# Patient Record
Sex: MALE | Race: WHITE | Employment: OTHER | URBAN - METROPOLITAN AREA
[De-identification: names, ages, dates, MRNs, and addresses within clinical notes are randomized per-mention and may not be internally consistent; named-entity substitution may affect disease eponyms.]

---

## 2019-08-13 ENCOUNTER — ANESTHESIA EVENT (OUTPATIENT)
Dept: SURGERY | Age: 69
DRG: 699 | End: 2019-08-13
Payer: MEDICARE

## 2019-08-13 ENCOUNTER — HOSPITAL ENCOUNTER (INPATIENT)
Age: 69
LOS: 1 days | Discharge: HOME | DRG: 699 | End: 2019-08-15
Attending: UROLOGY | Admitting: UROLOGY
Payer: MEDICARE

## 2019-08-13 DIAGNOSIS — N13.1 HYDRONEPHROSIS WITH URETERAL STRICTURE, NOT ELSEWHERE CLASSIFIED: Primary | ICD-10-CM

## 2019-08-13 LAB
EST. AVERAGE GLUCOSE BLD GHB EST-MCNC: 157 MG/DL
GLUCOSE BLD STRIP.AUTO-MCNC: 148 MG/DL (ref 70–110)
GLUCOSE BLD STRIP.AUTO-MCNC: 168 MG/DL (ref 70–110)
HBA1C MFR BLD: 7.1 % (ref 4.2–5.6)

## 2019-08-13 PROCEDURE — 74011250636 HC RX REV CODE- 250/636: Performed by: UROLOGY

## 2019-08-13 PROCEDURE — 77030005208 HC CATH HLDR GLWC -A: Performed by: UROLOGY

## 2019-08-13 PROCEDURE — 83036 HEMOGLOBIN GLYCOSYLATED A1C: CPT

## 2019-08-13 PROCEDURE — 74011250636 HC RX REV CODE- 250/636: Performed by: NURSE ANESTHETIST, CERTIFIED REGISTERED

## 2019-08-13 PROCEDURE — 77030027138 HC INCENT SPIROMETER -A

## 2019-08-13 PROCEDURE — 82962 GLUCOSE BLOOD TEST: CPT

## 2019-08-13 PROCEDURE — 36415 COLL VENOUS BLD VENIPUNCTURE: CPT

## 2019-08-13 PROCEDURE — C1729 CATH, DRAINAGE: HCPCS | Performed by: UROLOGY

## 2019-08-13 PROCEDURE — 99218 HC RM OBSERVATION: CPT

## 2019-08-13 RX ORDER — SODIUM CHLORIDE, SODIUM LACTATE, POTASSIUM CHLORIDE, CALCIUM CHLORIDE 600; 310; 30; 20 MG/100ML; MG/100ML; MG/100ML; MG/100ML
25 INJECTION, SOLUTION INTRAVENOUS CONTINUOUS
Status: DISCONTINUED | OUTPATIENT
Start: 2019-08-13 | End: 2019-08-13

## 2019-08-13 RX ORDER — METOPROLOL TARTRATE 50 MG/1
100 TABLET ORAL DAILY
Status: DISCONTINUED | OUTPATIENT
Start: 2019-08-14 | End: 2019-08-15 | Stop reason: HOSPADM

## 2019-08-13 RX ORDER — INSULIN LISPRO 100 [IU]/ML
INJECTION, SOLUTION INTRAVENOUS; SUBCUTANEOUS ONCE
Status: DISCONTINUED | OUTPATIENT
Start: 2019-08-14 | End: 2019-08-14 | Stop reason: SDUPTHER

## 2019-08-13 RX ORDER — TEMAZEPAM 7.5 MG/1
15 CAPSULE ORAL
Status: DISCONTINUED | OUTPATIENT
Start: 2019-08-13 | End: 2019-08-15 | Stop reason: HOSPADM

## 2019-08-13 RX ORDER — FAMOTIDINE 20 MG/1
20 TABLET, FILM COATED ORAL ONCE
Status: COMPLETED | OUTPATIENT
Start: 2019-08-14 | End: 2019-08-14

## 2019-08-13 RX ORDER — AMIODARONE HYDROCHLORIDE 200 MG/1
200 TABLET ORAL DAILY
Status: DISCONTINUED | OUTPATIENT
Start: 2019-08-14 | End: 2019-08-15 | Stop reason: HOSPADM

## 2019-08-13 RX ORDER — SODIUM CHLORIDE 9 MG/ML
100 INJECTION, SOLUTION INTRAVENOUS CONTINUOUS
Status: DISCONTINUED | OUTPATIENT
Start: 2019-08-13 | End: 2019-08-15 | Stop reason: HOSPADM

## 2019-08-13 RX ORDER — INSULIN LISPRO 100 [IU]/ML
INJECTION, SOLUTION INTRAVENOUS; SUBCUTANEOUS
Status: DISCONTINUED | OUTPATIENT
Start: 2019-08-13 | End: 2019-08-15 | Stop reason: HOSPADM

## 2019-08-13 RX ORDER — MAGNESIUM SULFATE 100 %
4 CRYSTALS MISCELLANEOUS AS NEEDED
Status: DISCONTINUED | OUTPATIENT
Start: 2019-08-13 | End: 2019-08-15 | Stop reason: HOSPADM

## 2019-08-13 RX ORDER — PANTOPRAZOLE SODIUM 40 MG/1
40 TABLET, DELAYED RELEASE ORAL
Status: DISCONTINUED | OUTPATIENT
Start: 2019-08-14 | End: 2019-08-15 | Stop reason: HOSPADM

## 2019-08-13 RX ORDER — ATORVASTATIN CALCIUM 40 MG/1
40 TABLET, FILM COATED ORAL
Status: DISCONTINUED | OUTPATIENT
Start: 2019-08-13 | End: 2019-08-15 | Stop reason: HOSPADM

## 2019-08-13 RX ADMIN — SODIUM CHLORIDE 100 ML/HR: 900 INJECTION, SOLUTION INTRAVENOUS at 23:00

## 2019-08-13 RX ADMIN — SODIUM CHLORIDE, SODIUM LACTATE, POTASSIUM CHLORIDE, AND CALCIUM CHLORIDE 25 ML/HR: 600; 310; 30; 20 INJECTION, SOLUTION INTRAVENOUS at 17:42

## 2019-08-13 NOTE — ROUTINE PROCESS
Bedside and Verbal shift change report given to Lon (oncoming nurse) by oTmeka Gordillo RN   (offgoing nurse). Report included the following information SBAR, Kardex and MAR.

## 2019-08-13 NOTE — H&P
Donnie Mckinney   1950  68 y.o.       UROLOGY H&P           Encounter Diagnoses       ICD-10-CM ICD-9-CM   1. Malignant neoplasm of urinary bladder, unspecified site (HCC) C67.9 188.9   2. Urinary tract infection without hematuria, site unspecified N39.0 599.0   3. Ureteral stricture N13.5 593. 3      Assessment:   1. 68 y.o. 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 Kelle Barr on 18 for [id number], No NAC               -Incidental prostate cancer- no canelo score provided              - Post-op course was complicated by prolonged ICU stay, AMS, BUBBA, ileus, bowel and urine leak, and flu              -S/p ileo-ileo anastomotic revision 18 in [place], [place]              -S/p b/l PCNs in 2018              -S/p bilateral PCN conversion to PCNU on 2019              -CTU 3/5/2019: bilateral PCN tubes and J-tubes extend from the kidneys to the conduit accident. no sign of obstruction              -CT Abd 2019: Mild left hydronephrosis, with urothelial enhancement of the bilateral renal collecting systems, and increased stranding around the left kidney, concerning for infection. -S/p bilateral PCNU change to bilateral PCN tubes on 19.               -Right PCN tube removed 2019.              -Renal Scan 19 - normal right renal function, mildly prolonged excretory half time for the left kidney related to possible ureteral stricture     2. Urine leak. managed with bilateral PCNs. Placed in 2018.         - Converted to PCNU 2019.         - Bilateral PCN tubes replaced 19.         - Right PCN tube removed 19     3. Bowel leak s/p ex-lap, small bowel resection with primary anastomosis 18     4. Left ureteral stricture.  S/p antegrade nephrostogram on 1/15/19 with left sided collecting system with mild hydronephrosis and small region of luminal narrowing superior to the left ureteral stent could represent a small stricture. Renal Scan 5/16/19 - mildly prolonged excretory half time for the left kidney related to possible ureteral stricture     5. Right subphrenic fluid collection identified on CT abd/pelvis 1/15/19.  6. Midline abdominal wound. Managed with aquacel AG by [facility]. 7. Afib on Xarelto   8. DM  9. HTN  10. Right renal cyst intermediate density on CT abd/pelvis 1/15/19     Plan:    ? Plan to continue with left PCN tube for now. Continue to flush daily. ? Start Augmentin 875/125 bid x 14 days, 10 days prior to procedure (today)  ? Continue aquacel AG and home [facility] wound care 2x/week for abdominal wound  ? Clearance given to hold Xarelto 2 days prior given by Dr. Cris Stout (cardio)  ? To OR for left loopogram, left anterograde nephrostogram, left anterograde ureteroscopy, with possible balloon dilation, possible biopsy, and left PCN exchange. R/b and indications reviewed     I am following the established plan for Dr. Anabella Natarajan and Dr. Samantha Singh is the supervising physician for this day.     Chief complaint:      Chief Complaint   Patient presents with    Hydronephrosis    Pre-op Exam      History of Present Illness: Grace Bence is a 68 y.o. 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 Here today for pre-op appointment.     He underwent RC/IC for muscle invasive bladder cancer in [place], [place] and had a complicated post-op course including AMS, BUBBA, ileus, bowel and urine leak requiring re-exploration with revision of anastomosis and b/l PCN placement. While in the HER unit, PIEDAD creatine was elevated at 3.1. S/p bilateral PCN conversion to PCNU on 2/26/2019. Recent CTU 3/5/2019 revealed bilateral stents in place. No signs of obstruction.   S/p bilateral nephrostomy tube change to bilateral PCN tubes on 5/9/19. Renal Scan on 5/16/19 revealed normal right renal function, mildly prolonged excretory half time for the left kidney related to a possible ureteral stricture. Right PCN tube removed 5/28/19.     Patient presents today with his wife. She notes his wound has been mildly improving and he continues using the aquacel and is seen by wound care 2x/week.      No major changes since last office visit. Reports clear yellow urine from left PCN and IC. Feels he is putting out more from left PCN than ostomy. No hematuria. Patient's biggest complaint is abdominal soreness. Reports improving wound, flattening. Saw Cardio and was given clearance to D/C Xarelto.     Past Medical History       Past Medical History:   Diagnosis Date    Bladder cancer [facility])      Cardiac arrhythmia      Cardiac disease      Chronic atrial fibrillation (HCC)      Diabetes mellitus ([facility] [address]      GERD (gastroesophageal reflux disease)      HTN (hypertension)      Hypercholesterolemia           Past Surgical History        Past Surgical History:   Procedure Laterality Date    HX CORONARY ARTERY BYPASS GRAFT   2008    HX CYSTECTOMY   11/26/2018    HX LITHOTRIPSY   11/2012    IR CONVERT NEPHRO PERC LT TO NEPHROURETERAL CATH EXISTING SI   2/26/2019    IR EXCHANGE NEPHRO PERC LT SI   5/9/2019         Family History  History reviewed.  No pertinent family history.     Social History  Social History            Socioeconomic History    Marital status:        Spouse name: Not on file    Number of children: Not on file    Years of education: Not on file    Highest education level: Not on file   Occupational History    Not on file   Social Needs    Financial resource strain: Not on file    Food insecurity:       Worry: Not on file       Inability: Not on file    Transportation needs:       Medical: Not on file       Non-medical: Not on file   Tobacco Use    Smoking status: Former Smoker       Types: Cigarettes    Smokeless tobacco: Former User   Substance and Sexual Activity    Alcohol use: No       Frequency: Never    Drug use: Not on file    Sexual activity: Not on file   Lifestyle    Physical activity:       Days per week: Not on file       Minutes per session: Not on file    Stress: Not on file   Relationships    Social connections:       Talks on phone: Not on file       Gets together: Not on file       Attends [employer] service: Not on file       Active member of club or organization: Not on file       Attends meetings of clubs or organizations: Not on file       Relationship status: Not on file    Intimate partner violence:       Fear of current or ex partner: Not on file       Emotionally abused: Not on file       Physically abused: Not on file       Forced sexual activity: Not on file   Other Topics Concern    Not on file   Social History Narrative    Not on file         No Known Allergies       Current Outpatient Medications   Medication Sig    omeprazole (PRILOSEC) 20 mg capsule TK 1 C PO QAM PRF ACID REFLUX    amoxicillin-clavulanate (AUGMENTIN) 875-125 mg per tablet Take 1 Tab by mouth every twelve (12) hours. Start 10 days prior to procedure    traMADol (ULTRAM) 50 mg tablet TK 1 TO 2 TS PO Q 12 H PRN P    insulin degludec (TRESIBA FLEXTOUCH U-100) 100 unit/mL (3 mL) inpn 5 Units by SubCUTAneous route.  acetaminophen (TYLENOL) 325 mg tablet 650 mg.    amiodarone (CORDARONE) 200 mg tablet Take 200 mg by mouth.  atorvastatin (LIPITOR) 40 mg tablet TK 1 T PO HS FOR CHOLESTEROL    glucose blood VI test strips (ASCENSIA AUTODISC VI, ONE TOUCH ULTRA TEST VI) strip 50 Each.  CARTIA  mg ER capsule TK 2 CS PO D    metoprolol tartrate (LOPRESSOR) 100 mg IR tablet Take 100 mg by mouth.  multivitamin, tx-iron-ca-min (THERAPY M) 9 mg iron-400 mcg tab tablet Take 1 Tab by mouth.     XARELTO 20 mg tab tablet TK 1 T PO D    temazepam (RESTORIL) 15 mg capsule 15 mg.      No current facility-administered medications for this visit.          Review of Systems  Constitutional: Fever: No  Skin: Rash: No  HEENT: Hearing difficulty: No  Eyes: Blurred vision: No  Cardiovascular: Chest pain: No  Respiratory: Shortness of breath: No  Gastrointestinal: Nausea/vomiting: No  Musculoskeletal: Back pain: No  Neurological: Weakness: No  Psychological: Memory loss: No  Comments/additional findings:         Physical Exam:   Visit Vitals  /82   Ht 5' 11\" (1.803 m)   Wt 201 lb (91.2 kg)   BMI 28.03 kg/m²      Constitutional: WDWN, Pleasant and appropriate affect, No acute distress. CV:  No peripheral swelling noted  Respiratory: No respiratory distress or difficulties  Abdomen:  No abdominal masses or tenderness. Soft, ND. Left PCN in place with clear yellow urine. Midline incision continues to close. Lower portion of the wound still somewhat open. Good granulation tissue. Skin: No jaundice. Neuro/Psych:  Alert and oriented, affect appropriate.      Labs reviewed today:        Results for orders placed or performed in visit on 05/28/19   URINE C&S   Result Value Ref Range     FINAL REPORT Microbiology results (A)     CYTOLOGY NON GYN, [facility]   Result Value Ref Range     APRESULT AP results           Radiology:     Renal Scan 5/16/2019  IMPRESSION  1. Normal right renal function  2. Mildly prolonged excretory half time for the left kidney but no evidence of mechanical obstruction.     CT A/P 4/11/2019  Impression:  1. Mild left hydronephrosis, with urothelial enhancement of the bilateral renal collecting systems, and increased stranding around the left kidney, concerning for infection.  -Bilateral percutaneous nephrostomy tubes and stents are in satisfactory position  2. Small fluid collection in the cystectomy bed in the pelvis, measuring 6.0 x 2.3 x 3.2 cm. This could potentially represent abscess or postoperative seroma.  This is new from prior study (previously a surgical drain was in this location). 3. Additional tiny fluid collection in the midline rectus musculature, just below the umbilicus, new from prior. 4. Stable low-attenuation lesion in the right interpolar kidney, measuring 1.4 cm, possibly a complex cyst.  5. Diverticulosis.     CTU 3/5/2019  IMPRESSION   The patient has had a prior cystectomy. Percutaneous nephrostomy tubes are noted bilaterally. J-tubes extend from the kidneys to the conduit accident. There is no sign of obstruction. Tiny calcifications in the kidneys are probably vascular. A simple cyst is present at the upper pole of the right kidney. In the midportion of the kidney there is a vague area of decreased density. The appearance has not changed significantly since the previous study, but malignancy is not excluded.     Diverticulosis with no sign of diverticulitis. Prior cholecystectomy.        Antegrade nephrostograms 1/15/19:      1. Left-sided collecting system with mild hydronephrosis. No obstruction to contrast.   2. Very small region of luminal narrowing superior to the left ureteral stent could represent a small stricture. 3. Right collecting system is patent.       See additional details above.       As attending radiologist, I have assessed the study images and dictated or reviewed/edited the final report as needed.       Dictated ByKristi Cavanaugh on 1/15/2019 3:14 PM       As attending radiologist, I have assessed the study images, and dictated or reviewed/edited the final report as needed.       Signed By: Silvano Santillan MD on 1/15/2019 4:27 PM           Narrative   NEPHROSTOGRAM       INDICATION: Bilateral percutaneous nephrostomies, and ureteral stents. Ileal conduit       FLUOROSCOPY TIME: 0.3 minutes   FLUOROSCOPIC IMAGES: 37 fluoroscopic images. 3 overhead images. COMPARISON: CT abdomen pelvis 1/15/2019       TECHNIQUE: Bilateral percutaneous nephrostomies were accessed at the skin surface.  The PCNs were interrogated with 20 cc of Isovue 350 on the right and 35 on the left. After instillation of contrast, AP and oblique images were obtained. PCN tubes were flushed with 10 cc of saline.        FINDINGS:   image demonstrates bilateral PCNs and ureteral stents with ileal conduit. Trace residual contrast remains from prior study. Proximal loops of the ureteral stents remain at roughly proximal mid ureter level       The right PCN and stent are intact. No hydronephrosis or filling defects identified. No obstruction.       The left PCN and stent are intact. 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 The collecting system is mildly hydronephrotic relative to the right. Just superior to the left stent there is mild narrowing of the ureteral lumen. No filling defects identified. No obstruction.          CT abd/pelvis 1/15/19:      Postoperative cystectomy. Ureteral stents and nephrostomies noted bilaterally. Both J stents are positioned with the proximal loops in the upper ureter 6 cm distal to the renal pelvis.    Small right subphrenic fluid collection   Nonsimple right renal cyst of intermediate density.       Signed By: Jazmín Parr MD on 1/15/2019 9:10 AM           Narrative   EXAM: CT ABDOMEN AND PELVIS WITH CONTRAST       CLINICAL INDICATION/HISTORY: I would like a CT A/P with and without contrast to look for any occult fluid collections.  Pt is looking to D/C to SNF soon.  Additionally, he is looking to transition care [facility] from outside group.  Thank you.       COMPARISON: No prior       TECHNIQUE:  CT abdomen and pelvis with 100 cc of Omnipaque 350 IV contrast.   All CT scans at this facility are performed using dose optimization technique as appropriate to the performed examination, to include automated exposure control, adjustment of the mA and/or kV according to patient's size (including appropriate matching for site-specific examinations), or use of an iterative reconstruction technique.       FINDINGS:    Lower chest: Negative.       Liver: There are 2 small simple appearing hepatic cysts noted ranging up to 2.7 cm. Otherwise negative.       Biliary: Gallbladder surgically absent. Biliary tree is not dilated.       Pancreas: Negative.       Spleen: Negative.       Adrenal glands: Negative.       Kidneys: There are bilateral nephrostomies in place with mild fullness of the right renal collecting system and mild prominence of the urothelium. A 2.1 cm intermediate density abnormality is seen in the right mid kidney laterally and a 1.2 cm simple appearing cyst is seen superiorly on the right. A J stent is present with the tip in the proximal ureter and the distal and extending out a conduit. On the left the configuration is similar with the proximal J in the upper ureter extending out into the conduit.       Bladder and Pelvic Organs: The bladder is surgically absent. A surgical drain is seen in the bladder bed       Stomach, Small Bowel and Colon: The stomach and small bowel are unremarkable. The appendix is normal in appearance. Colonic diverticulosis is noted.       Lymph nodes: No lymphadenopathy.        Vessels: Unremarkable for age.        Peritoneal Spaces: There is a small subphrenic collection on the right which measures 1.1 x 5.8 x 8 cm.       Body wall: Negative.       Bones: Senescent changes are noted without unexpected finding for age.         A copy of today's office visit with all pertinent imaging results and labs were sent to the referring physician. CC: MD Valery Kumari PA-C  [facility]      Patient's BMI is out of the normal parameters.   Information about BMI was given and patient was advised to follow-up with their PCP for further management.

## 2019-08-13 NOTE — ROUTINE PROCESS
1630: Pt arrived to the unit. Accepted by [facility]. Care assumed at this time. No acute distress noted. IV initiated at this time. Pt tolerated well. Denise Cruz paged by charge nurse for orders. Will continue to monitor. 1800: Plans for surgery in am. Pt resting at this time with wife at the bedside. No acute changes noted. No requests at this time. Will monitor.

## 2019-08-14 ENCOUNTER — APPOINTMENT (OUTPATIENT)
Dept: GENERAL RADIOLOGY | Age: 69
DRG: 699 | End: 2019-08-14
Attending: UROLOGY
Payer: MEDICARE

## 2019-08-14 ENCOUNTER — ANESTHESIA (OUTPATIENT)
Dept: SURGERY | Age: 69
DRG: 699 | End: 2019-08-14
Payer: MEDICARE

## 2019-08-14 PROBLEM — N13.30 HYDRONEPHROSIS: Status: ACTIVE | Noted: 2019-08-14

## 2019-08-14 LAB
ANION GAP SERPL CALC-SCNC: 7 MMOL/L (ref 3–18)
BUN SERPL-MCNC: 24 MG/DL (ref 7–18)
BUN/CREAT SERPL: 26 (ref 12–20)
CALCIUM SERPL-MCNC: 8.4 MG/DL (ref 8.5–10.1)
CHLORIDE SERPL-SCNC: 109 MMOL/L (ref 100–111)
CO2 SERPL-SCNC: 27 MMOL/L (ref 21–32)
CREAT SERPL-MCNC: 0.94 MG/DL (ref 0.6–1.3)
ERYTHROCYTE [DISTWIDTH] IN BLOOD BY AUTOMATED COUNT: 16.2 % (ref 11.6–14.5)
GLUCOSE BLD STRIP.AUTO-MCNC: 132 MG/DL (ref 70–110)
GLUCOSE BLD STRIP.AUTO-MCNC: 141 MG/DL (ref 70–110)
GLUCOSE BLD STRIP.AUTO-MCNC: 144 MG/DL (ref 70–110)
GLUCOSE BLD STRIP.AUTO-MCNC: 165 MG/DL (ref 70–110)
GLUCOSE SERPL-MCNC: 142 MG/DL (ref 74–99)
HCT VFR BLD AUTO: 31.2 % (ref 36–48)
HGB BLD-MCNC: 9.9 G/DL (ref 13–16)
INR PPP: 1.3 (ref 0.8–1.2)
MCH RBC QN AUTO: 24.9 PG (ref 24–34)
MCHC RBC AUTO-ENTMCNC: 31.7 G/DL (ref 31–37)
MCV RBC AUTO: 78.6 FL (ref 74–97)
PLATELET # BLD AUTO: 164 K/UL (ref 135–420)
PMV BLD AUTO: 10.5 FL (ref 9.2–11.8)
POTASSIUM SERPL-SCNC: 3.9 MMOL/L (ref 3.5–5.5)
PROTHROMBIN TIME: 16 SEC (ref 11.5–15.2)
RBC # BLD AUTO: 3.97 M/UL (ref 4.7–5.5)
SODIUM SERPL-SCNC: 143 MMOL/L (ref 136–145)
WBC # BLD AUTO: 5.2 K/UL (ref 4.6–13.2)

## 2019-08-14 PROCEDURE — 0TB78ZX EXCISION OF LEFT URETER, VIA NATURAL OR ARTIFICIAL OPENING ENDOSCOPIC, DIAGNOSTIC: ICD-10-PCS | Performed by: UROLOGY

## 2019-08-14 PROCEDURE — 74011250636 HC RX REV CODE- 250/636

## 2019-08-14 PROCEDURE — 88300 SURGICAL PATH GROSS: CPT

## 2019-08-14 PROCEDURE — 74011250637 HC RX REV CODE- 250/637: Performed by: NURSE ANESTHETIST, CERTIFIED REGISTERED

## 2019-08-14 PROCEDURE — 99218 HC RM OBSERVATION: CPT

## 2019-08-14 PROCEDURE — BT121ZZ FLUOROSCOPY OF LEFT KIDNEY USING LOW OSMOLAR CONTRAST: ICD-10-PCS | Performed by: UROLOGY

## 2019-08-14 PROCEDURE — 74011636320 HC RX REV CODE- 636/320: Performed by: UROLOGY

## 2019-08-14 PROCEDURE — 0T9130Z DRAINAGE OF LEFT KIDNEY WITH DRAINAGE DEVICE, PERCUTANEOUS APPROACH: ICD-10-PCS | Performed by: UROLOGY

## 2019-08-14 PROCEDURE — C1758 CATHETER, URETERAL: HCPCS | Performed by: UROLOGY

## 2019-08-14 PROCEDURE — 74011636637 HC RX REV CODE- 636/637: Performed by: ANESTHESIOLOGY

## 2019-08-14 PROCEDURE — 74011000258 HC RX REV CODE- 258: Performed by: UROLOGY

## 2019-08-14 PROCEDURE — 85027 COMPLETE CBC AUTOMATED: CPT

## 2019-08-14 PROCEDURE — 85610 PROTHROMBIN TIME: CPT

## 2019-08-14 PROCEDURE — 80048 BASIC METABOLIC PNL TOTAL CA: CPT

## 2019-08-14 PROCEDURE — 88305 TISSUE EXAM BY PATHOLOGIST: CPT

## 2019-08-14 PROCEDURE — 88112 CYTOPATH CELL ENHANCE TECH: CPT

## 2019-08-14 PROCEDURE — 77030018836 HC SOL IRR NACL ICUM -A: Performed by: UROLOGY

## 2019-08-14 PROCEDURE — 36415 COLL VENOUS BLD VENIPUNCTURE: CPT

## 2019-08-14 PROCEDURE — 74011250637 HC RX REV CODE- 250/637: Performed by: UROLOGY

## 2019-08-14 PROCEDURE — BT1G1ZZ FLUOROSCOPY OF ILEAL LOOP, URETERS AND KIDNEYS USING LOW OSMOLAR CONTRAST: ICD-10-PCS | Performed by: UROLOGY

## 2019-08-14 PROCEDURE — 74011250636 HC RX REV CODE- 250/636: Performed by: UROLOGY

## 2019-08-14 PROCEDURE — 74425 UROGRAPHY ANTEGRADE RS&I: CPT

## 2019-08-14 PROCEDURE — 76060000035 HC ANESTHESIA 2 TO 2.5 HR: Performed by: UROLOGY

## 2019-08-14 PROCEDURE — 74011250637 HC RX REV CODE- 250/637: Performed by: ANESTHESIOLOGY

## 2019-08-14 PROCEDURE — 76010000171 HC OR TIME 2 TO 2.5 HR INTENSV-TIER 1: Performed by: UROLOGY

## 2019-08-14 PROCEDURE — 74011250636 HC RX REV CODE- 250/636: Performed by: ANESTHESIOLOGY

## 2019-08-14 PROCEDURE — 77030032490 HC SLV COMPR SCD KNE COVD -B: Performed by: UROLOGY

## 2019-08-14 PROCEDURE — 82962 GLUCOSE BLOOD TEST: CPT

## 2019-08-14 PROCEDURE — 76210000006 HC OR PH I REC 0.5 TO 1 HR: Performed by: UROLOGY

## 2019-08-14 PROCEDURE — 74011000250 HC RX REV CODE- 250

## 2019-08-14 RX ORDER — MIDAZOLAM HYDROCHLORIDE 1 MG/ML
INJECTION, SOLUTION INTRAMUSCULAR; INTRAVENOUS AS NEEDED
Status: DISCONTINUED | OUTPATIENT
Start: 2019-08-14 | End: 2019-08-14 | Stop reason: HOSPADM

## 2019-08-14 RX ORDER — SODIUM CHLORIDE, SODIUM LACTATE, POTASSIUM CHLORIDE, CALCIUM CHLORIDE 600; 310; 30; 20 MG/100ML; MG/100ML; MG/100ML; MG/100ML
100 INJECTION, SOLUTION INTRAVENOUS CONTINUOUS
Status: DISCONTINUED | OUTPATIENT
Start: 2019-08-14 | End: 2019-08-14 | Stop reason: HOSPADM

## 2019-08-14 RX ORDER — GENTAMICIN SULFATE 40 MG/ML
INJECTION, SOLUTION INTRAMUSCULAR; INTRAVENOUS AS NEEDED
Status: DISCONTINUED | OUTPATIENT
Start: 2019-08-14 | End: 2019-08-14 | Stop reason: HOSPADM

## 2019-08-14 RX ORDER — FENTANYL CITRATE 50 UG/ML
50 INJECTION, SOLUTION INTRAMUSCULAR; INTRAVENOUS AS NEEDED
Status: DISCONTINUED | OUTPATIENT
Start: 2019-08-14 | End: 2019-08-14 | Stop reason: HOSPADM

## 2019-08-14 RX ORDER — SODIUM CHLORIDE 0.9 % (FLUSH) 0.9 %
5-40 SYRINGE (ML) INJECTION AS NEEDED
Status: DISCONTINUED | OUTPATIENT
Start: 2019-08-14 | End: 2019-08-14 | Stop reason: HOSPADM

## 2019-08-14 RX ORDER — PROPOFOL 10 MG/ML
INJECTION, EMULSION INTRAVENOUS AS NEEDED
Status: DISCONTINUED | OUTPATIENT
Start: 2019-08-14 | End: 2019-08-14 | Stop reason: HOSPADM

## 2019-08-14 RX ORDER — MAGNESIUM SULFATE 100 %
4 CRYSTALS MISCELLANEOUS AS NEEDED
Status: DISCONTINUED | OUTPATIENT
Start: 2019-08-14 | End: 2019-08-14 | Stop reason: HOSPADM

## 2019-08-14 RX ORDER — HYDROMORPHONE HYDROCHLORIDE 2 MG/ML
0.5 INJECTION, SOLUTION INTRAMUSCULAR; INTRAVENOUS; SUBCUTANEOUS
Status: DISCONTINUED | OUTPATIENT
Start: 2019-08-14 | End: 2019-08-14 | Stop reason: HOSPADM

## 2019-08-14 RX ORDER — FENTANYL CITRATE 50 UG/ML
INJECTION, SOLUTION INTRAMUSCULAR; INTRAVENOUS AS NEEDED
Status: DISCONTINUED | OUTPATIENT
Start: 2019-08-14 | End: 2019-08-14 | Stop reason: HOSPADM

## 2019-08-14 RX ORDER — LIDOCAINE HYDROCHLORIDE 20 MG/ML
INJECTION, SOLUTION EPIDURAL; INFILTRATION; INTRACAUDAL; PERINEURAL AS NEEDED
Status: DISCONTINUED | OUTPATIENT
Start: 2019-08-14 | End: 2019-08-14 | Stop reason: HOSPADM

## 2019-08-14 RX ORDER — SODIUM CHLORIDE 0.9 % (FLUSH) 0.9 %
5-40 SYRINGE (ML) INJECTION EVERY 8 HOURS
Status: DISCONTINUED | OUTPATIENT
Start: 2019-08-14 | End: 2019-08-14 | Stop reason: HOSPADM

## 2019-08-14 RX ORDER — DIPHENHYDRAMINE HCL 25 MG
25 CAPSULE ORAL
Status: DISCONTINUED | OUTPATIENT
Start: 2019-08-14 | End: 2019-08-15 | Stop reason: HOSPADM

## 2019-08-14 RX ORDER — MORPHINE SULFATE 4 MG/ML
4 INJECTION INTRAVENOUS
Status: DISCONTINUED | OUTPATIENT
Start: 2019-08-14 | End: 2019-08-15 | Stop reason: HOSPADM

## 2019-08-14 RX ORDER — FAMOTIDINE 20 MG/1
20 TABLET, FILM COATED ORAL ONCE
Status: COMPLETED | OUTPATIENT
Start: 2019-08-14 | End: 2019-08-14

## 2019-08-14 RX ORDER — ONDANSETRON 2 MG/ML
4 INJECTION INTRAMUSCULAR; INTRAVENOUS ONCE
Status: DISCONTINUED | OUTPATIENT
Start: 2019-08-14 | End: 2019-08-14 | Stop reason: HOSPADM

## 2019-08-14 RX ORDER — ROCURONIUM BROMIDE 10 MG/ML
INJECTION, SOLUTION INTRAVENOUS AS NEEDED
Status: DISCONTINUED | OUTPATIENT
Start: 2019-08-14 | End: 2019-08-14 | Stop reason: HOSPADM

## 2019-08-14 RX ORDER — ONDANSETRON 2 MG/ML
4 INJECTION INTRAMUSCULAR; INTRAVENOUS
Status: DISCONTINUED | OUTPATIENT
Start: 2019-08-14 | End: 2019-08-15 | Stop reason: HOSPADM

## 2019-08-14 RX ORDER — SUCCINYLCHOLINE CHLORIDE 100 MG/5ML
SYRINGE (ML) INTRAVENOUS AS NEEDED
Status: DISCONTINUED | OUTPATIENT
Start: 2019-08-14 | End: 2019-08-14 | Stop reason: HOSPADM

## 2019-08-14 RX ORDER — OXYCODONE AND ACETAMINOPHEN 5; 325 MG/1; MG/1
1 TABLET ORAL
Status: DISCONTINUED | OUTPATIENT
Start: 2019-08-14 | End: 2019-08-15 | Stop reason: HOSPADM

## 2019-08-14 RX ORDER — INSULIN LISPRO 100 [IU]/ML
INJECTION, SOLUTION INTRAVENOUS; SUBCUTANEOUS ONCE
Status: COMPLETED | OUTPATIENT
Start: 2019-08-14 | End: 2019-08-14

## 2019-08-14 RX ORDER — ONDANSETRON HYDROCHLORIDE 4 MG/2ML
INJECTION, SOLUTION INTRAMUSCULAR; INTRAVENOUS AS NEEDED
Status: DISCONTINUED | OUTPATIENT
Start: 2019-08-14 | End: 2019-08-14 | Stop reason: HOSPADM

## 2019-08-14 RX ADMIN — OXYCODONE HYDROCHLORIDE AND ACETAMINOPHEN 1 TABLET: 5; 325 TABLET ORAL at 21:42

## 2019-08-14 RX ADMIN — PROPOFOL 120 MG: 10 INJECTION, EMULSION INTRAVENOUS at 09:27

## 2019-08-14 RX ADMIN — SODIUM CHLORIDE 100 ML/HR: 900 INJECTION, SOLUTION INTRAVENOUS at 21:52

## 2019-08-14 RX ADMIN — FENTANYL CITRATE 100 MCG: 50 INJECTION, SOLUTION INTRAMUSCULAR; INTRAVENOUS at 09:27

## 2019-08-14 RX ADMIN — LIDOCAINE HYDROCHLORIDE 80 MG: 20 INJECTION, SOLUTION EPIDURAL; INFILTRATION; INTRACAUDAL; PERINEURAL at 09:27

## 2019-08-14 RX ADMIN — MIDAZOLAM HYDROCHLORIDE 2 MG: 1 INJECTION, SOLUTION INTRAMUSCULAR; INTRAVENOUS at 09:11

## 2019-08-14 RX ADMIN — INSULIN LISPRO 3 UNITS: 100 INJECTION, SOLUTION INTRAVENOUS; SUBCUTANEOUS at 08:57

## 2019-08-14 RX ADMIN — ATORVASTATIN CALCIUM 40 MG: 40 TABLET, FILM COATED ORAL at 21:42

## 2019-08-14 RX ADMIN — SODIUM CHLORIDE 100 ML/HR: 900 INJECTION, SOLUTION INTRAVENOUS at 12:37

## 2019-08-14 RX ADMIN — FAMOTIDINE 20 MG: 20 TABLET ORAL at 08:55

## 2019-08-14 RX ADMIN — METOPROLOL TARTRATE 100 MG: 50 TABLET ORAL at 13:55

## 2019-08-14 RX ADMIN — ONDANSETRON HYDROCHLORIDE 4 MG: 4 INJECTION, SOLUTION INTRAMUSCULAR; INTRAVENOUS at 11:12

## 2019-08-14 RX ADMIN — TOBRAMYCIN SULFATE 376.5 MG: 40 INJECTION, SOLUTION INTRAMUSCULAR; INTRAVENOUS at 00:52

## 2019-08-14 RX ADMIN — Medication 100 MG: at 09:27

## 2019-08-14 RX ADMIN — FAMOTIDINE 20 MG: 20 TABLET ORAL at 00:07

## 2019-08-14 RX ADMIN — OXYCODONE HYDROCHLORIDE AND ACETAMINOPHEN 1 TABLET: 5; 325 TABLET ORAL at 14:39

## 2019-08-14 RX ADMIN — SODIUM CHLORIDE, SODIUM LACTATE, POTASSIUM CHLORIDE, AND CALCIUM CHLORIDE 100 ML/HR: 600; 310; 30; 20 INJECTION, SOLUTION INTRAVENOUS at 08:55

## 2019-08-14 RX ADMIN — ATORVASTATIN CALCIUM 40 MG: 40 TABLET, FILM COATED ORAL at 00:07

## 2019-08-14 RX ADMIN — ROCURONIUM BROMIDE 5 MG: 10 INJECTION, SOLUTION INTRAVENOUS at 09:27

## 2019-08-14 RX ADMIN — AMIODARONE HYDROCHLORIDE 200 MG: 200 TABLET ORAL at 13:55

## 2019-08-14 NOTE — PROGRESS NOTES
Pharmacy Dosing Services: Aminoglycosides    Aminoglycoside agent: Tobramycin    Indication: UTI    Day of therapy: 6 of 7,  7 day treatment started on 08/08/2019    Other Antimicrobials (Include dose, start day & day of therapy): N/A    Significant Cultures:     Extended or traditional dosing: Extended interval dosing  For extended dosing, follow nomogram  For traditional dosing:   goal peak  [place] Nomogram  Goal trough [place] Nomogram  Dosing weight (IBW or adjusted weight) IBW 75.3 kg    Level (if applicable):   89/47/2467 0.8 mcg/mL random level       Renal function stable? (unstable defined as SCr increase of 0.5 mg/dL or > 50% increase from baseline, whichever is greater) (Y/N): Y     CAPD, Hemodialysis or Renal Replacement Therapy (Y/N): N     Estimated Creatinine Clearance: 80.2 mL/min (based on SCr of 0.9 mg/dL). Regimen assessment: Continuing treatment started at another facility  Maintenance dose: 376.5 mg every 24 hours  Next scheduled level: Finishing therapy started at another facility   BMP ordered for 08/09/2019 at [address] [place] [address] will follow daily and adjust medications as appropriate for renal function and/or serum levels.     Thank you,  Renaldo Zamudio, TANYAD

## 2019-08-14 NOTE — PROGRESS NOTES
Bedside and Verbal shift change report given to SONJA Clemons (oncoming nurse) by Rob Balderas RN (offgoing nurse). Report included the following information SBAR, Kardex, Procedure Summary, Intake/Output and MAR.     0805- Off floor for surgery. 1238- Back on antwan from surgery. 1645- Pt notified this nurse that he went to the bathroom and noticed that his nephrostomy tube on the left side had came out. Pt stated he was not sure how it happened he just noticed it next him when he was back in bed.     1650- Dr. Palomo Wood made aware. Bedside and Verbal shift change report given to Roxy Collazo (oncoming nurse) by SONJA Clemons (offgoing nurse). Report included the following information SBAR, Kardex, Procedure Summary, Intake/Output and MAR.

## 2019-08-14 NOTE — ANESTHESIA PREPROCEDURE EVALUATION
Relevant Problems   No relevant active problems       Anesthetic History   No history of anesthetic complications            Review of Systems / Medical History  Patient summary reviewed and pertinent labs reviewed    Pulmonary  Within defined limits                 Neuro/Psych   Within defined limits           Cardiovascular    Hypertension        Dysrhythmias   CAD and CABG    Exercise tolerance: <4 METS     GI/Hepatic/Renal     GERD: well controlled    Renal disease       Endo/Other    Diabetes: well controlled, type 2         Other Findings              Physical Exam    Airway  Mallampati: III  TM Distance: 4 - 6 cm  Neck ROM: decreased range of motion   Mouth opening: Normal     Cardiovascular    Rhythm: regular  Rate: normal         Dental    Dentition: Poor dentition, Full upper dentures and Lower partial plate     Pulmonary  Breath sounds clear to auscultation               Abdominal  GI exam deferred       Other Findings            Anesthetic Plan    ASA: 3  Anesthesia type: general          Induction: Intravenous  Anesthetic plan and risks discussed with: Patient

## 2019-08-14 NOTE — PROGRESS NOTES
1945 Bedside, Verbal and Written shift change report given to Darian CASILLAS (oncoming nurse) by Ludin Corey RN (offgoing nurse). Report included the following information SBAR, Kardex, Intake/Output, MAR and Recent Results. Pt awake, A&OX4, denies pain. spouse at bedside. Reviewed PTA medications, discussed plan with pharmacist to continue tobramycin      2200 PM medications administered, pt tolerated with ease, will continue to monitor. 0000 NPO for possible procedure. Shift reassessment, pt sleeping between care, will continue to monitor. 0400  Shift reassessment, pt condition unchanged, will continue to monitor. 0740 Bedside, Verbal and Written shift change report given to Paulina  (oncoming nurse) by Micaela Juarez (offgoing nurse). Report included the following information SBAR, Kardex, Intake/Output, MAR and Recent Results. Skin assessment completed.

## 2019-08-14 NOTE — BRIEF OP NOTE
BRIEF OPERATIVE NOTE    Date of Procedure: 8/14/2019   Preoperative Diagnosis: Malignant Neoplasm of Urinary Bladder, Unspecified Site [facility])  Postoperative Diagnosis: Malignant Neoplasm of Urinary Bladder, Unspecified Site [facility])    Procedure(s):  Loopogram  Left Antegrade Nephrostogram,   Left Antegrade Ureteroscopy with ureteral Biopsy with Laser  Left Percutaneous Nephrostomy Tube Exchange    Surgeon(s) and Role:     Radha Stevenson MD - Primary         Surgical Assistant: none    Surgical Staff:  Circ-1: Chandan Pereira RN  Radiology Technician: Yemi Chen  Scrub Tech-1: Coub  Event Time In Time Out   Incision Start 08/14/2019  9:55 AM    Incision Close  11:27 AM      Anesthesia: General   Estimated Blood Loss: minimal  Specimens:   ID Type Source Tests Collected by Time Destination   1 : LEFT DISTAL URETERAL BIOPSY Preservative URETER, LEFT  Luz Eli MD 8/14/2019 11:03 AM Pathology   1 : LEFT URETERAL URINE FOR CYTOLOGY  URETER, LEFT  Luz Eli MD 8/14/2019 10:53 AM Cytology      Findings: completely obliterated left ureteroenteric anastomosis, reflux up right ureter on loopogram, no visible tumor seen   Complications: none  Implants: * No implants in log *  Drain: Left 10 Fr PCN    Joaquin Armenta MD  , Dept of Urology  [facility]  Urology [place], [place]  Pager #: [phone]

## 2019-08-14 NOTE — PROGRESS NOTES
Progress Note    Patient: Ryan Delvalle MRN: [id number]  SSN: [id number]    YOB: 1950  Age: 68 y.o. Sex: male      Admit Date: 8/13/2019    LOS: 0 days     Subjective:   SHELTON. No N/V.  VSS. Afebrile. NPO. Objective:     Vitals:    08/13/19 2048 08/13/19 2258 08/13/19 2358 08/14/19 0356   BP: 167/90  159/82 168/80   Pulse: 70  86 93   Resp: 17  18 18   Temp: 98.2 °F (36.8 °C)  97.9 °F (36.6 °C) 98.3 °F (36.8 °C)   SpO2: 96%  95% 97%   Weight:  193 lb (87.5 kg)     Height:  5' 11\" (1.803 m)          Intake and Output:  Current Shift: No intake/output data recorded. Last three shifts: 08/12 1901 - 08/14 0700  In: -   Out: 1700 [Urine:1700]    Physical Exam:   GENERAL: alert, cooperative, no distress, appears stated age  LUNG: clear to auscultation bilaterally  HEART: regular rate and rhythm  ABDOMEN: soft, NT, ND  : ileal conduit with yellow urine, L PCN with yellow urine    Lab/Data Review:  BMP:   Lab Results   Component Value Date/Time     08/14/2019 01:30 AM    K 3.9 08/14/2019 01:30 AM     08/14/2019 01:30 AM    CO2 27 08/14/2019 01:30 AM    AGAP 7 08/14/2019 01:30 AM     (H) 08/14/2019 01:30 AM    BUN 24 (H) 08/14/2019 01:30 AM    CREA 0.94 08/14/2019 01:30 AM    GFRAA >60 08/14/2019 01:30 AM    GFRNA >60 08/14/2019 01:30 AM     CBC:   Lab Results   Component Value Date/Time    WBC 5.2 08/14/2019 01:30 AM    HGB 9.9 (L) 08/14/2019 01:30 AM    HCT 31.2 (L) 08/14/2019 01:30 AM     08/14/2019 01:30 AM     COAGS:   Lab Results   Component Value Date/Time    PTP 16.0 (H) 08/14/2019 01:30 AM    INR 1.3 (H) 08/14/2019 01:30 AM          Assessment:   1. 69 y. o. 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 Ana Cowart on 11/26/18 for [id number], No NAC               -Incidental prostate cancer- no canelo score provided              - Post-op course was complicated by prolonged ICU stay, AMS, BUBBA, ileus, bowel and urine leak, and flu              -S/p ileo-ileo anastomotic revision 12/4/18 in [place], [place]              -S/p b/l PCNs in 11/2018              -S/p bilateral PCN conversion to PCNU on 2/26/2019              -CTU 3/5/2019: bilateral PCN tubes and J-tubes extend from the kidneys to the conduit accident. no sign of obstruction              -CT Abd 4/11/2019: Mild left hydronephrosis, with urothelial enhancement of the bilateral renal collecting systems, and increased stranding around the left kidney, concerning for infection.              -S/p bilateral PCNU change to bilateral PCN tubes on 5/9/19.               -Right PCN tube removed 5/28/2019.              -Renal Scan 5/16/19 - normal right renal function, mildly prolonged excretory half time for the left kidney related to possible ureteral stricture     2. Urine leak. managed with bilateral PCNs. Placed in 11/2018.         - Converted to PCNU 2/26/2019.         - Bilateral PCN tubes replaced 5/9/19.         - Right PCN tube removed 5/28/19     3. Bowel leak s/p ex-lap, small bowel resection with primary anastomosis 12/4/18     4. Left ureteral stricture. S/p antegrade nephrostogram on 1/15/19 with left sided collecting system with mild hydronephrosis and small region of luminal narrowing superior to the left ureteral stent could represent a small stricture.              Renal Scan 5/16/19 - mildly prolonged excretory half time for the left kidney related to possible ureteral stricture     5. Right subphrenic fluid collection identified on CT abd/pelvis 1/15/19.  6. Midline abdominal wound. Managed with aquacel AG by [facility]. 7. Afib on Xarelto   8. DM  9. HTN  10.  Right renal cyst intermediate density on CT abd/pelvis 1/15/19    Plan:     OR today for loopogram, left antegrade ureteroscopy, possible biopsy, balloon dilation of stricture, left PCN exchange  Hold Igor, wife confirmed last dose was Sunday  Will send Cx intra-op  Received tobra last night, continue tobra today and plan to keep overnight  Labs reviewed  NPO  Consent signed, all questions answered    Signed By: Dannielle Bills MD     August 14, 2019

## 2019-08-14 NOTE — PROGRESS NOTES
conducted an initial consultation and Spiritual Assessment for Hernando Valencia, who is a 68 y.o.,male. Patients Primary Language is: [place]. According to the patients EMR Pentecostalism Affiliation is: [place]. The reason the Patient came to the hospital is:   Patient Active Problem List    Diagnosis Date Noted    Hydronephrosis 08/14/2019        The  provided the following Interventions:  Initiated a relationship of care and support. Provided information about Spiritual Care Services. Offered prayer and assurance of continued prayers on patient's behalf. The following outcomes were achieved:  Patient expressed gratitude for 's visit. Assessment:  There are no further spiritual or Alevism issues which require intervention at this time. Plan:  Chaplains will continue to follow and will provide pastoral care on an as needed/requested basis. Ramiro Chris M.Div.   , [facility]: [phone]/BP: [phone]

## 2019-08-14 NOTE — PROGRESS NOTES
Called by RN that left PCN got pulled out despite suturing in with Prolene and secure IR dressing. D/w patient and wife. Will make NPO p MN and IR consult placed for left PCN tomorrow. INR today 1.3.      Belinda Herman MD  , Dept of Urology  [facility]  [facility], [address]  Pager #: [phone]

## 2019-08-14 NOTE — PERIOP NOTES
PACU Summary  Patient arrived to PACU at 1137  Bedside/Verbal report received from Casey Louis RN  Vitals:    08/14/19 1137 08/14/19 1142 08/14/19 1147 08/14/19 1152   BP: (!) 174/98 (!) 176/102 (!) 154/94 (!) 161/97   Pulse:  87 84 88   Resp:  14 15 14   Temp:  98 °F (36.7 °C)     SpO2:  100% 100% 96%   Weight:       Height:         Cardiac rhythm: Atrial Fibrillation     Lines and Drains  Peripheral Intravenous Line:   Peripheral IV 08/13/19 Left Arm (Active)   Site Assessment Clean, dry, & intact 8/14/2019 11:37 AM   Phlebitis Assessment 0 8/14/2019 11:37 AM   Infiltration Assessment 0 8/14/2019 11:37 AM   Dressing Status Clean, dry, & intact 8/14/2019 11:37 AM   Dressing Type Tape;Transparent 8/14/2019 11:37 AM   Hub Color/Line Status Pink; Infusing 8/14/2019 11:37 AM   Action Taken Open ports on tubing capped 8/14/2019  4:00 AM   Alcohol Cap Used Yes 8/14/2019  4:00 AM    and Drain(s):       Wound        Intake and Output    Intake/Output Summary (Last 24 hours) at 8/14/2019 1200  Last data filed at 8/14/2019 1119  Gross per 24 hour   Intake 600 ml   Output 1700 ml   Net -1100 ml     1214 called waiting room to notify family that patient is returning to his room    Report called to [facility] in 2200 at 1210    Patient transported to 2217 at [address] [place] [address]

## 2019-08-14 NOTE — MANAGEMENT PLAN
Discharge/Transition Planning    Pt went in for surgery. Will complete interview after and give observation letters. 1400:  Patient and/or next of kin has been given and has signed the [facility] Outpatient Observation  Notification letter and all questions answered. Copy of this notice given to patient and copy placed on chart. Patient and/or next of kin has been given the Outpatient Observation Information and Notification letter and all questions answered. Reason for Admission:   adenocarcinoma of the bladder                   RRAT Score:          7           Plan for utilizing home health:      Using [facility]. Do not need new orders under observation                    Current Advanced Directive/Advance Care Plan: none                         Transition of Care Plan:                    Interviewed patient with permission of wife and son to be in room. Verified demographics listed on face sheet with patient; all information correct. Pt with Medicare A&B and no secondary Patient stated their PCP is Dr Leonel Alvarado and last appt 3 weeks ago . Patient lives in single family home with spouse. Patient's NOK isspouse Patient independent with ADLs prior to admission. DME prior to admission: has nephrostomy tube and had ostomy bag for urostomy. Discharge plan is Home and continued [place]. Wife high anxiety level and concerned ostomy bag not available and pt going home with catheter in ostomy. Notified wife that CM could not give answer for this but will pass along to pt RN and their concern will be addressed. RN Pili Larose is aware      Patient has designated ______wife and son__________________ to participate in his/her discharge plan and to receive any needed information. Nando Oh Spouse [phone]     Valdo Rosenthal   [phone]       Care Management Interventions  PCP Verified by CM: Yes([facility], [facility])  Last Visit to PCP: 07/24/19  Mode of Transport at Discharge:  Other (see comment)(wife)  Transition of Care Consult (CM Consult): Discharge Planning  Current Support Network: Lives with Spouse, Own Home  Confirm Follow Up Transport: Self  Plan discussed with Pt/Family/Caregiver: Yes  Discharge Location  Discharge Placement: Home

## 2019-08-14 NOTE — ANESTHESIA POSTPROCEDURE EVALUATION
Procedure(s):  Loopogram, Left Antegrade Nephrostogram, Left Antegrade, Ureteroscopy,  Biopsy , Left Percutaneous Nephrostomy Tube Exchange. general    Anesthesia Post Evaluation      Multimodal analgesia: multimodal analgesia used between 6 hours prior to anesthesia start to PACU discharge  Patient location during evaluation: bedside  Patient participation: complete - patient participated  Level of consciousness: awake  Pain management: adequate  Airway patency: patent  Anesthetic complications: no  Cardiovascular status: stable  Respiratory status: acceptable  Hydration status: acceptable  Post anesthesia nausea and vomiting:  controlled      Vitals Value Taken Time   /91 8/14/2019 12:22 PM   Temp 36.7 °C (98 °F) 8/14/2019 11:42 AM   Pulse 86 8/14/2019 12:26 PM   Resp 15 8/14/2019 12:26 PM   SpO2 96 % 8/14/2019 12:26 PM   Vitals shown include unvalidated device data.

## 2019-08-14 NOTE — PERIOP NOTES
Pt arrived to pre-op with wife at bedside. Dr. Palomo Wood spoke with both pt and wife. Consent completed. Pre--proc checklist completed in [facility]. Call bell within reach. No pain at this time.

## 2019-08-14 NOTE — PROGRESS NOTES
Problem: Falls - Risk of  Goal: *Absence of Falls  Description  Document Keyon Capellan Fall Risk and appropriate interventions in the flowsheet. Outcome: Progressing Towards Goal  Note:   Fall Risk Interventions:            Medication Interventions: Teach patient to arise slowly, Patient to call before getting OOB                   Problem: Patient Education: Go to Patient Education Activity  Goal: Patient/Family Education  Outcome: Progressing Towards Goal     Problem: Diabetes Self-Management  Goal: *Disease process and treatment process  Description  Define diabetes and identify own type of diabetes; list 3 options for treating diabetes. Outcome: Progressing Towards Goal  Goal: *Incorporating nutritional management into lifestyle  Description  Describe effect of type, amount and timing of food on blood glucose; list 3 methods for planning meals. Outcome: Progressing Towards Goal  Goal: *Incorporating physical activity into lifestyle  Description  State effect of exercise on blood glucose levels. Outcome: Progressing Towards Goal  Goal: *Developing strategies to promote health/change behavior  Description  Define the ABC's of diabetes; identify appropriate screenings, schedule and personal plan for screenings. Outcome: Progressing Towards Goal  Goal: *Using medications safely  Description  State effect of diabetes medications on diabetes; name diabetes medication taking, action and side effects. Outcome: Progressing Towards Goal  Goal: *Monitoring blood glucose, interpreting and using results  Description  Identify recommended blood glucose targets  and personal targets. Outcome: Progressing Towards Goal  Goal: *Prevention, detection, treatment of acute complications  Description  List symptoms of hyper- and hypoglycemia; describe how to treat low blood sugar and actions for lowering  high blood glucose level.   Outcome: Progressing Towards Goal  Goal: *Prevention, detection and treatment of chronic complications  Description  Define the natural course of diabetes and describe the relationship of blood glucose levels to long term complications of diabetes.   Outcome: Progressing Towards Goal  Goal: *Developing strategies to address psychosocial issues  Description  Describe feelings about living with diabetes; identify support needed and support network  Outcome: Progressing Towards Goal  Goal: *Insulin pump training  Outcome: Progressing Towards Goal  Goal: *Sick day guidelines  Outcome: Progressing Towards Goal  Goal: *Patient Specific Goal (EDIT GOAL, INSERT TEXT)  Outcome: Progressing Towards Goal     Problem: Patient Education: Go to Patient Education Activity  Goal: Patient/Family Education  Outcome: Progressing Towards Goal     Problem: Pain  Goal: *Control of Pain  Outcome: Progressing Towards Goal  Goal: *PALLIATIVE CARE:  Alleviation of Pain  Outcome: Progressing Towards Goal

## 2019-08-15 ENCOUNTER — HOSPITAL ENCOUNTER (OUTPATIENT)
Dept: CARDIAC CATH/INVASIVE PROCEDURES | Age: 69
Setting detail: OBSERVATION
Discharge: HOME | DRG: 699 | End: 2019-08-15
Attending: UROLOGY
Payer: MEDICARE

## 2019-08-15 VITALS
DIASTOLIC BLOOD PRESSURE: 83 MMHG | OXYGEN SATURATION: 97 % | HEART RATE: 75 BPM | RESPIRATION RATE: 16 BRPM | WEIGHT: 193 LBS | BODY MASS INDEX: 27.02 KG/M2 | TEMPERATURE: 97.1 F | HEIGHT: 71 IN | SYSTOLIC BLOOD PRESSURE: 131 MMHG

## 2019-08-15 VITALS
HEART RATE: 97 BPM | RESPIRATION RATE: 17 BRPM | SYSTOLIC BLOOD PRESSURE: 170 MMHG | DIASTOLIC BLOOD PRESSURE: 101 MMHG | OXYGEN SATURATION: 99 %

## 2019-08-15 LAB
ANION GAP SERPL CALC-SCNC: 5 MMOL/L (ref 3–18)
BUN SERPL-MCNC: 20 MG/DL (ref 7–18)
BUN/CREAT SERPL: 18 (ref 12–20)
CALCIUM SERPL-MCNC: 8.1 MG/DL (ref 8.5–10.1)
CHLORIDE SERPL-SCNC: 110 MMOL/L (ref 100–111)
CO2 SERPL-SCNC: 29 MMOL/L (ref 21–32)
CREAT SERPL-MCNC: 1.12 MG/DL (ref 0.6–1.3)
GLUCOSE BLD STRIP.AUTO-MCNC: 129 MG/DL (ref 70–110)
GLUCOSE BLD STRIP.AUTO-MCNC: 167 MG/DL (ref 70–110)
GLUCOSE SERPL-MCNC: 114 MG/DL (ref 74–99)
POTASSIUM SERPL-SCNC: 4.4 MMOL/L (ref 3.5–5.5)
SODIUM SERPL-SCNC: 144 MMOL/L (ref 136–145)

## 2019-08-15 PROCEDURE — 74011250636 HC RX REV CODE- 250/636: Performed by: RADIOLOGY

## 2019-08-15 PROCEDURE — 99218 HC RM OBSERVATION: CPT

## 2019-08-15 PROCEDURE — 82962 GLUCOSE BLOOD TEST: CPT

## 2019-08-15 PROCEDURE — 74011636320 HC RX REV CODE- 636/320: Performed by: UROLOGY

## 2019-08-15 PROCEDURE — 77030010545

## 2019-08-15 PROCEDURE — 36415 COLL VENOUS BLD VENIPUNCTURE: CPT

## 2019-08-15 PROCEDURE — 80048 BASIC METABOLIC PNL TOTAL CA: CPT

## 2019-08-15 PROCEDURE — 65270000029 HC RM PRIVATE

## 2019-08-15 PROCEDURE — 0T25X0Z CHANGE DRAINAGE DEVICE IN KIDNEY, EXTERNAL APPROACH: ICD-10-PCS | Performed by: RADIOLOGY

## 2019-08-15 PROCEDURE — 77030005208 HC CATH HLDR GLWC -A

## 2019-08-15 PROCEDURE — C1729 CATH, DRAINAGE: HCPCS

## 2019-08-15 PROCEDURE — 74011250636 HC RX REV CODE- 250/636: Performed by: UROLOGY

## 2019-08-15 PROCEDURE — 50432 PLMT NEPHROSTOMY CATHETER: CPT

## 2019-08-15 PROCEDURE — 77030025702 HC BG URIN DRN MRTM -A

## 2019-08-15 PROCEDURE — 74011250636 HC RX REV CODE- 250/636

## 2019-08-15 PROCEDURE — 74011250637 HC RX REV CODE- 250/637: Performed by: UROLOGY

## 2019-08-15 PROCEDURE — 74011000258 HC RX REV CODE- 258: Performed by: UROLOGY

## 2019-08-15 PROCEDURE — 77030004565 HC CATH ANGI DX TMPO CARD -B

## 2019-08-15 RX ORDER — CEFAZOLIN SODIUM 2 G/50ML
2 SOLUTION INTRAVENOUS ONCE
Status: DISCONTINUED | OUTPATIENT
Start: 2019-08-15 | End: 2019-08-15

## 2019-08-15 RX ORDER — FENTANYL CITRATE 50 UG/ML
25-100 INJECTION, SOLUTION INTRAMUSCULAR; INTRAVENOUS AS NEEDED
Status: DISCONTINUED | OUTPATIENT
Start: 2019-08-15 | End: 2019-08-19 | Stop reason: HOSPADM

## 2019-08-15 RX ORDER — FENTANYL CITRATE 50 UG/ML
25-100 INJECTION, SOLUTION INTRAMUSCULAR; INTRAVENOUS AS NEEDED
Status: DISCONTINUED | OUTPATIENT
Start: 2019-08-15 | End: 2019-08-15

## 2019-08-15 RX ORDER — MIDAZOLAM HYDROCHLORIDE 1 MG/ML
.5-2 INJECTION, SOLUTION INTRAMUSCULAR; INTRAVENOUS AS NEEDED
Status: DISCONTINUED | OUTPATIENT
Start: 2019-08-15 | End: 2019-08-19 | Stop reason: HOSPADM

## 2019-08-15 RX ORDER — LIDOCAINE HYDROCHLORIDE 10 MG/ML
1-30 INJECTION, SOLUTION EPIDURAL; INFILTRATION; INTRACAUDAL; PERINEURAL AS NEEDED
Status: DISCONTINUED | OUTPATIENT
Start: 2019-08-15 | End: 2019-08-19 | Stop reason: HOSPADM

## 2019-08-15 RX ORDER — LIDOCAINE HYDROCHLORIDE 10 MG/ML
1-30 INJECTION, SOLUTION EPIDURAL; INFILTRATION; INTRACAUDAL; PERINEURAL AS NEEDED
Status: DISCONTINUED | OUTPATIENT
Start: 2019-08-15 | End: 2019-08-15

## 2019-08-15 RX ORDER — HEPARIN SODIUM 200 [USP'U]/100ML
500 INJECTION, SOLUTION INTRAVENOUS
Status: DISCONTINUED | OUTPATIENT
Start: 2019-08-15 | End: 2019-08-15

## 2019-08-15 RX ORDER — MIDAZOLAM HYDROCHLORIDE 1 MG/ML
.5-2 INJECTION, SOLUTION INTRAMUSCULAR; INTRAVENOUS AS NEEDED
Status: DISCONTINUED | OUTPATIENT
Start: 2019-08-15 | End: 2019-08-15

## 2019-08-15 RX ORDER — HEPARIN SODIUM 200 [USP'U]/100ML
500 INJECTION, SOLUTION INTRAVENOUS
Status: DISCONTINUED | OUTPATIENT
Start: 2019-08-15 | End: 2019-08-19 | Stop reason: HOSPADM

## 2019-08-15 RX ADMIN — OXYCODONE HYDROCHLORIDE AND ACETAMINOPHEN 1 TABLET: 5; 325 TABLET ORAL at 12:03

## 2019-08-15 RX ADMIN — OXYCODONE HYDROCHLORIDE AND ACETAMINOPHEN 1 TABLET: 5; 325 TABLET ORAL at 16:53

## 2019-08-15 RX ADMIN — OXYCODONE HYDROCHLORIDE AND ACETAMINOPHEN 1 TABLET: 5; 325 TABLET ORAL at 06:54

## 2019-08-15 RX ADMIN — FENTANYL CITRATE 50 MCG: 50 INJECTION, SOLUTION INTRAMUSCULAR; INTRAVENOUS at 08:14

## 2019-08-15 RX ADMIN — TOBRAMYCIN SULFATE 376.5 MG: 40 INJECTION, SOLUTION INTRAMUSCULAR; INTRAVENOUS at 01:28

## 2019-08-15 RX ADMIN — METOPROLOL TARTRATE 100 MG: 50 TABLET ORAL at 09:12

## 2019-08-15 RX ADMIN — HEPARIN SODIUM 1000 UNITS: 200 INJECTION, SOLUTION INTRAVENOUS at 08:15

## 2019-08-15 RX ADMIN — IOPAMIDOL 5 ML: 612 INJECTION, SOLUTION INTRAVENOUS at 08:14

## 2019-08-15 RX ADMIN — PANTOPRAZOLE SODIUM 40 MG: 40 TABLET, DELAYED RELEASE ORAL at 09:12

## 2019-08-15 RX ADMIN — MIDAZOLAM HYDROCHLORIDE 1 MG: 1 INJECTION, SOLUTION INTRAMUSCULAR; INTRAVENOUS at 08:14

## 2019-08-15 RX ADMIN — AMIODARONE HYDROCHLORIDE 200 MG: 200 TABLET ORAL at 09:12

## 2019-08-15 NOTE — PROGRESS NOTES
Problem: Falls - Risk of  Goal: *Absence of Falls  Description  Document Rome Barber Fall Risk and appropriate interventions in the flowsheet. 8/14/2019 2054 by Rj Montilla RN  Outcome: Progressing Towards Goal  Note:   Fall Risk Interventions:            Medication Interventions: Teach patient to arise slowly, Patient to call before getting OOB                8/14/2019 2054 by Rj Montilla RN  Outcome: Progressing Towards Goal  Note:   Fall Risk Interventions:            Medication Interventions: Teach patient to arise slowly, Patient to call before getting OOB                   Problem: Patient Education: Go to Patient Education Activity  Goal: Patient/Family Education  8/14/2019 2054 by Rj Montilla RN  Outcome: Progressing Towards Goal  8/14/2019 2054 by Rj Montilla RN  Outcome: Progressing Towards Goal     Problem: Diabetes Self-Management  Goal: *Disease process and treatment process  Description  Define diabetes and identify own type of diabetes; list 3 options for treating diabetes. 8/14/2019 2054 by Rj Montilla RN  Outcome: Progressing Towards Goal  8/14/2019 2054 by Rj Montilla RN  Outcome: Progressing Towards Goal  Goal: *Incorporating nutritional management into lifestyle  Description  Describe effect of type, amount and timing of food on blood glucose; list 3 methods for planning meals. 8/14/2019 2054 by Rj Montilla RN  Outcome: Progressing Towards Goal  8/14/2019 2054 by Rj Montilla RN  Outcome: Progressing Towards Goal  Goal: *Incorporating physical activity into lifestyle  Description  State effect of exercise on blood glucose levels.   8/14/2019 2054 by Rj Montilla RN  Outcome: Progressing Towards Goal  8/14/2019 2054 by Rj Montilla RN  Outcome: Progressing Towards Goal  Goal: *Developing strategies to promote health/change behavior  Description  Define the ABC's of diabetes; identify appropriate screenings, schedule and personal plan for screenings. 8/14/2019 2054 by Tee Andrews RN  Outcome: Progressing Towards Goal  8/14/2019 2054 by Tee Andrews RN  Outcome: Progressing Towards Goal  Goal: *Using medications safely  Description  State effect of diabetes medications on diabetes; name diabetes medication taking, action and side effects. 8/14/2019 2054 by Tee Andrews RN  Outcome: Progressing Towards Goal  8/14/2019 2054 by Tee Andrews RN  Outcome: Progressing Towards Goal  Goal: *Monitoring blood glucose, interpreting and using results  Description  Identify recommended blood glucose targets  and personal targets. 8/14/2019 2054 by Tee Andrews RN  Outcome: Progressing Towards Goal  8/14/2019 2054 by Tee Andrews RN  Outcome: Progressing Towards Goal  Goal: *Prevention, detection, treatment of acute complications  Description  List symptoms of hyper- and hypoglycemia; describe how to treat low blood sugar and actions for lowering  high blood glucose level. 8/14/2019 2054 by Tee Andrews RN  Outcome: Progressing Towards Goal  8/14/2019 2054 by Tee Andrews RN  Outcome: Progressing Towards Goal  Goal: *Prevention, detection and treatment of chronic complications  Description  Define the natural course of diabetes and describe the relationship of blood glucose levels to long term complications of diabetes.   8/14/2019 2054 by Tee Andrews RN  Outcome: Progressing Towards Goal  8/14/2019 2054 by Tee Andrews RN  Outcome: Progressing Towards Goal  Goal: *Developing strategies to address psychosocial issues  Description  Describe feelings about living with diabetes; identify support needed and support network  8/14/2019 2054 by Tee Andrews RN  Outcome: Progressing Towards Goal  8/14/2019 2054 by Tee Andrews RN  Outcome: Progressing Towards Goal  Goal: *Insulin pump training  8/14/2019 2054 by Tee Andrews RN  Outcome: Progressing Towards Goal  8/14/2019 2054 by Montana Evans RN  Outcome: Progressing Towards Goal  Goal: *Sick day guidelines  8/14/2019 2054 by Montana Evans RN  Outcome: Progressing Towards Goal  8/14/2019 2054 by Montana Evans RN  Outcome: Progressing Towards Goal  Goal: *Patient Specific Goal (EDIT GOAL, INSERT TEXT)  8/14/2019 2054 by Montana Evans RN  Outcome: Progressing Towards Goal  8/14/2019 2054 by Montana Evans RN  Outcome: Progressing Towards Goal     Problem: Patient Education: Go to Patient Education Activity  Goal: Patient/Family Education  8/14/2019 2054 by Montana Evans RN  Outcome: Progressing Towards Goal  8/14/2019 2054 by Montana Evans RN  Outcome: Progressing Towards Goal     Problem: Pain  Goal: *Control of Pain  8/14/2019 2054 by Montana Evans RN  Outcome: Progressing Towards Goal  8/14/2019 2054 by Montana Evans RN  Outcome: Progressing Towards Goal  Goal: *PALLIATIVE CARE:  Alleviation of Pain  8/14/2019 2054 by Montana Evans RN  Outcome: Progressing Towards Goal  8/14/2019 2054 by Montana Evans RN  Outcome: Progressing Towards Goal     Problem: Patient Education: Go to Patient Education Activity  Goal: Patient/Family Education  8/14/2019 2054 by Montana Evans RN  Outcome: Progressing Towards Goal  8/14/2019 2054 by Montana Evans RN  Outcome: Progressing Towards Goal     Problem: Nephrectomy Pathway: Day 1  Goal: Off Pathway (Use only if patient is Off Pathway)  8/14/2019 2054 by Montana Evans RN  Outcome: Progressing Towards Goal  8/14/2019 2054 by Montana Evans RN  Outcome: Progressing Towards Goal  Goal: Activity/Safety  8/14/2019 2054 by Montana Evans RN  Outcome: Progressing Towards Goal  8/14/2019 2054 by Montana Evans RN  Outcome: Progressing Towards Goal  Goal: Consults, if ordered  8/14/2019 2054 by Montana Evans RN  Outcome: Progressing Towards Goal  8/14/2019 2054 by Jose Blakely RN  Outcome: Progressing Towards Goal  Goal: Nutrition/Diet  8/14/2019 2054 by Jose Blakely RN  Outcome: Progressing Towards Goal  8/14/2019 2054 by Jose Blakely RN  Outcome: Progressing Towards Goal  Goal: Medications  8/14/2019 2054 by Jose Blakely RN  Outcome: Progressing Towards Goal  8/14/2019 2054 by Jose Blakely RN  Outcome: Progressing Towards Goal  Goal: Respiratory  8/14/2019 2054 by Jose Blakely RN  Outcome: Progressing Towards Goal  8/14/2019 2054 by Jose Blakely RN  Outcome: Progressing Towards Goal  Goal: Treatments/Interventions/Procedures  8/14/2019 2054 by Jose Blakely RN  Outcome: Progressing Towards Goal  8/14/2019 2054 by Jose Blakely RN  Outcome: Progressing Towards Goal  Goal: Psychosocial  8/14/2019 2054 by Jose Blakely RN  Outcome: Progressing Towards Goal  8/14/2019 2054 by Jose Blakely RN  Outcome: Progressing Towards Goal  Goal: *No signs and symptoms of infection or wound complications  6/54/7451 2054 by Jose Blakely RN  Outcome: Progressing Towards Goal  8/14/2019 2054 by Jose Blakely RN  Outcome: Progressing Towards Goal  Goal: *Optimal pain control at patient's stated goal  8/14/2019 2054 by Jose Blakely RN  Outcome: Progressing Towards Goal  8/14/2019 2054 by Jose Blakely RN  Outcome: Progressing Towards Goal  Goal: *Adequate urinary output (equal to or greater than 30 milliliters/hour)  8/14/2019 2054 by Jose Blakely RN  Outcome: Progressing Towards Goal  8/14/2019 2054 by Jose Blakely RN  Outcome: Progressing Towards Goal  Goal: *Hemodynamically stable  8/14/2019 2054 by Jose Blakely RN  Outcome: Progressing Towards Goal  8/14/2019 2054 by Jose Blakely RN  Outcome: Progressing Towards Goal  Goal: *Tolerating diet  8/14/2019 2054 by Jose Blakely RN  Outcome: Progressing Towards Goal  8/14/2019 2054 by Fabio Maza RN  Outcome: Progressing Towards Goal  Goal: *Demonstrates progressive activity  8/14/2019 2054 by Fabio Maza RN  Outcome: Progressing Towards Goal  8/14/2019 2054 by Fabio Maza RN  Outcome: Progressing Towards Goal     Problem: Pressure Injury - Risk of  Goal: *Prevention of pressure injury  Description  Document Ralph Scale and appropriate interventions in the flowsheet.   8/14/2019 2054 by Fabio Maza RN  Outcome: Progressing Towards Goal  Note:   Pressure Injury Interventions:  Sensory Interventions: Pressure redistribution bed/mattress (bed type)                   Nutrition Interventions: Document food/fluid/supplement intake                  8/14/2019 2054 by Fabio Maza RN  Outcome: Progressing Towards Goal  Note:   Pressure Injury Interventions:  Sensory Interventions: Pressure redistribution bed/mattress (bed type)                   Nutrition Interventions: Document food/fluid/supplement intake                     Problem: Patient Education: Go to Patient Education Activity  Goal: Patient/Family Education  8/14/2019 2054 by Fabio Maza RN  Outcome: Progressing Towards Goal  8/14/2019 2054 by Fabio Maza RN  Outcome: Progressing Towards Goal

## 2019-08-15 NOTE — H&P
The patient is an appropriate candidate to undergo L PCN. Patient assessed immediately prior to induction. Anesthesia plan as follows:   Conscious Sedation. Planned agent(s):  fentanyl and versed    ASA Score:  ASA 2 - Mild systemic disease    History and Physical update:  H&P was reviewed and the patient was examined. No changes have occurred in the patient's condition since the H&P was completed.     Tierra Saenz MD  Vascular & Interventional Radiology  [facility]  8/15/2019

## 2019-08-15 NOTE — PROGRESS NOTES
TRANSFER - OUT REPORT:    Verbal report given to Soledad Akers RN on Gabriela Tate being transferred to 221 for routine progression of care       Report consisted of patient's Situation, Background, Assessment and   Recommendations(SBAR). Information from the following report(s) SBAR was reviewed with the receiving nurse. Opportunity for questions and clarification was provided.       Patient transported with:   [employer]

## 2019-08-15 NOTE — DISCHARGE INSTRUCTIONS
DISCHARGE SUMMARY from Nurse    PATIENT INSTRUCTIONS:    After general anesthesia or intravenous sedation, for 24 hours or while taking prescription Narcotics:  · Limit your activities  · Do not drive and operate hazardous machinery  · Do not make important personal or business decisions  · Do  not drink alcoholic beverages  · If you have not urinated within 8 hours after discharge, please contact your surgeon on call. Report the following to your surgeon:  · Excessive pain, swelling, redness or odor of or around the surgical area  · Temperature over 100.5  · Nausea and vomiting lasting longer than 4 hours or if unable to take medications  · Any signs of decreased circulation or nerve impairment to extremity: change in color, persistent  numbness, tingling, coldness or increase pain  · Any questions    What to do at Home:  Recommended activity: Activity as tolerated and no driving for today. If you experience any of the following symptoms like increasing pain, please follow up with Dr. Cheryle Kocher. *  Please give a list of your current medications to your Primary Care Provider. *  Please update this list whenever your medications are discontinued, doses are      changed, or new medications (including over-the-counter products) are added. *  Please carry medication information at all times in case of emergency situations. These are general instructions for a healthy lifestyle:    No smoking/ No tobacco products/ Avoid exposure to second hand smoke  Surgeon General's Warning:  Quitting smoking now greatly reduces serious risk to your health.     Obesity, smoking, and sedentary lifestyle greatly increases your risk for illness    A healthy diet, regular physical exercise & weight monitoring are important for maintaining a healthy lifestyle    You may be retaining fluid if you have a history of heart failure or if you experience any of the following symptoms:  Weight gain of 3 pounds or more overnight or 5 pounds in a week, increased swelling in our hands or feet or shortness of breath while lying flat in bed. Please call your doctor as soon as you notice any of these symptoms; do not wait until your next office visit. Patient {ARMBANDS:06205}   MyChart Activation    Thank you for requesting access to [employer]. Please follow the instructions below to securely access and download your online medical record. [employer] allows you to send messages to your doctor, view your test results, renew your prescriptions, schedule appointments, and more. How Do I Sign Up? 1. In your internet browser, go to [email or web address]  2. Click on the First Time User? Click Here link in the Sign In box. You will be redirect to the New Member Sign Up page. 3. Enter your [employer] Access Code exactly as it appears below. You will not need to use this code after youve completed the sign-up process. If you do not sign up before the expiration date, you must request a new code. [employer] Access Code: [email or web address]  Expires: 2019  2:26 PM (This is the date your [employer] access code will )    4. Enter the last four digits of your Social Security Number (xxxx) and Date of Birth (mm/dd/yyyy) as indicated and click Submit. You will be taken to the next sign-up page. 5. Create a [employer] ID. This will be your [employer] login ID and cannot be changed, so think of one that is secure and easy to remember. 6. Create a [employer] password. You can change your password at any time. 7. Enter your Password Reset Question and Answer. This can be used at a later time if you forget your password. 8. Enter your e-mail address. You will receive e-mail notification when new information is available in [address] 9. Click Sign Up. You can now view and download portions of your medical record. 10. Click the Download Summary menu link to download a portable copy of your medical information.     Additional Information    If you have questions, please visit the Frequently Asked Questions section of the [email or web address] website at [email or web address] Remember, [email or web address] is NOT to be used for urgent needs. For medical emergencies, dial 911. The discharge information has been reviewed with the {PATIENT PARENT GUARDIAN:70053}. The {PATIENT PARENT GUARDIAN:68510} verbalized understanding. Discharge medications reviewed with the {Dishcarge meds reviewed GDEF:61122} and appropriate educational materials and side effects teaching were provided.   ___________________________________________________________________________________________________________________________________

## 2019-08-15 NOTE — PROCEDURES
Vascular & Interventional Radiology Brief Procedure Note    Interventional Radiologist: Evon Hunter MD    Pre-operative Diagnosis:  L PCN    Post-operative Diagnosis: Same as pre-op dx    Procedure(s) Performed:  L PCN    Anesthesia:  Local and Moderate Sedation    Findings:  Uncomplicated replacement of 8F L PCN via existing tract.      Complications: None    Estimated Blood Loss:  minimal    Tubes and Drains: None    Specimens: None    Condition: Good       Evon Hunter MD  [facility]  Vascular & Interventional Radiology  8/15/2019

## 2019-08-15 NOTE — OP NOTES
[facility]  OPERATIVE REPORT    Name:  Gwen Thibodeaux  MR#:   [id number]  :  1950  ACCOUNT #:  [de-identified]  DATE OF SERVICE:  2019    PREOPERATIVE DIAGNOSES:  1. 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 2.  Left hydronephrosis. POSTOPERATIVE DIAGNOSES:  1. 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 2.  Left hydronephrosis. PROCEDURES PERFORMED:  1. Loopogram.  2.  Left antegrade ureteroscopy with biopsies. 3.  Left antegrade nephrostogram.  4.  Left percutaneous nephrostomy tube exchange. 5.  Fluoroscopy time less than 30 minutes with interrogation. SURGEON:  Sandra Draper MD    ASSISTANT:  None. ANESTHESIA:  General.    COMPLICATIONS:  None. SPECIMENS REMOVED:  1. Left ureter urine for cytology. 2.  Left distal ureter biopsies in formalin. IMPLANTS/GRAFTS:  None. DRAINS:  1.  18-[place]-tip catheter per ileal conduit. 2.  Left 10-Togolese percutaneous nephrostomy tube. ESTIMATED BLOOD LOSS:  Minimal.    IV FLUIDS:  Crystalloid per anesthesia records. CONDITION:  Stable. INDICATION FOR THE PROCEDURE:  The patient is a 77-year-old male who has got a complicated urologic history. He underwent a radical cystectomy and ileal conduit in [facility]. This was in 2018. He was diagnosed with a mucinous adenocarcinoma at the time of radical cystectomy, he had no residual disease. He did not get any of his chemotherapy. He had a very complicated postoperative course. He had an ex-lap and had a conduit revision done about a week after his initial surgery. He had nephrostomy tubes placed postoperatively. These were exchanged for PCNUs back in February and these were most recently exchanged about 3 months ago for bilateral PCN.   His right PCN has subsequently been removed as a renal scan showed there was no obstruction on the right side. He did have a prolonged exploratory time of the left kidney which was consistent with obstruction, so the left percutaneous nephrostomy tube remained. He has been following my partner, Dr. 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 We have been trying to get him scheduled for this for quite some time. He has been having recurrent UTIs. He completed a course of IV tobramycin for a week at home, was re-admitted this time for IV antibiotics. He had held his Xarelto 3 days prior to surgery. He had been cleared by Cardiology and Internal Medicine as well. Discussed risks, benefits, and alternatives with the patient. He agreed and consented to proceed. PROCEDURE:  The patient was taken to the operating room. A preoperative time-out was performed identifying the correct patient, procedure to be done. He was prepped and draped in sterile fashion. He was placed in a flank position with the left side up. He had received a dose of IV tobramycin last night. He had SCD cycling prior to induction of anesthesia. .  His preoperative INR was 1.3. Once he was prepped, we used the percutaneous nephrolithotomy drape. I examined the ileal conduit stoma. He had significant stomal stenosis which I was able to get a flexible ureteroscope into this area and then we passed a wire in the conduit and then I serially dilated the stomal stenosis up to about 22-Belarusian. This allowed me to place an 18-Bel[place]ian Mulino-tip catheter over the wire and inflated the balloon with 5 mL sterile water. We did a loopogram by instilling diluted contrast under gravity. The loopogram revealed reflux of the right ureter and renal collecting system. There was no contrast seen going into the left ureter or kidney.   The conduit itself did not appear to be dilated. At this point, I decided to leave the catheter in the stoma given the stenosis. This will likely need a revision at a later date. We then threaded a wire through his nephrostomy tube, curled this in the renal pelvis. I then used a Kumpe catheter and angled this down to the proximal ureter. We were then able to get the sensor wire down into the distal ureter. We did do an antegrade nephrostogram before placing the wire. This showed no contrast going into the conduit and there was significant J-hooking at the distal ureter as it approached the conduit. Once I had 1 wire down, I then made a small incision to the skin. I used the fascial incising knife to huff the lumbar dorsal fascia. I dilated this track with an 8/10 dilator, passed the second sensor wire down. We then advanced 11/13 [facility] ureteral access sheath over the wire. This easily passed down to the proximal ureter. I then assembled a flexible ureteroscope using a single action pump. We examined the entire length of the ureter. No tumors were seen. No tumors were seen in the renal pelvis either. We then examined the distal ureter and the anastomosis. I did not see a lumen where the anastomosis was. I attempted to pass a wire through this area and it buckled where the anastomosis would be. This appeared to be a completely obliterated stricture. There was no tumor noted at the anastomotic site. I did send a urine cytology from this location as well as taken a biopsy to ensure this was not apparent. At this point, I did another antegrade nephrostogram.  There was no extravasation of contrast.  We then removed the scope and the access sheath out. We removed our safety wire. We had one wire remaining. I then placed a 10-Citizen of [place] percutaneous nephrostomy tube over the wire and once the tip of the catheter was at the proximal ureter, we removed the wire leaving the PCN in place.   We then sutured the PCN with a 2-0 Prolene and used a secure dressing from Interventional Radiology to secure the nephrostomy. At this point, the procedure was completed. The patient tolerated procedure well. There were no immediate complications and he was transferred to recovery room in stable condition.'    PLAN:  He will be admitted for observation and we will get another dose of IV antibiotics. Likely be discharged tomorrow if there are no issues and we will send a urine culture at the PACU and he can resume his Xarelto tomorrow as long as the urine from the PCN is not too bloody. He will need to follow up with Dr. Jason Ambrocio to discuss further management in terms of his stricture and the stenosis. Ideally, he would need a stromal revision as well as an open ureteroenteric revision, although with his numerous abdominal surgeries, this could prove quite difficult. Other option will be to leave a nephrostomy tube and have that changed regularly.       Evy Fernando MD      [id number]  D:  08/14/2019 11:39  T:  08/14/2019 17:41  JOB #:  [id number]  CC:  Alaina Velez MD

## 2019-08-15 NOTE — PHYSICIAN ADVISORY
Letter of Admission Status Determination: Upgrade to Inpatient       Grace Bence was hospitalized as observation status on 8/13/2019. Since Grace Bence required medically necessary hospital care spanning at least two midnights, he has met the benchmark for Inpatient Admission status. Based on the documented clinical condition and care plan, we recommend upgrading patient's hospitalization status to INPATIENT. The final decision regarding the patient's hospitalization status depends on the attending physician's clinical judgment.        Raquel Zavaleta MD, GEOVANI, [address] [place] [address] [facility], [address] [place] [address]  [phone]

## 2019-08-15 NOTE — PROGRESS NOTES
Problem: Discharge Planning  Goal: *Discharge to safe environment  Outcome: Resolved/Met   Plan home    Pt dc'd home, no services ordered. Care Management Interventions  PCP Verified by CM: Yes([facility])  Last Visit to PCP: 07/24/19  Mode of Transport at Discharge:  Other (see comment)(wife)  Transition of Care Consult (CM Consult): Discharge Planning  Current Support Network: Lives with Spouse, Own Home  Confirm Follow Up Transport: Self  Plan discussed with Pt/Family/Caregiver: Yes  Discharge Location  Discharge Placement: Home

## 2019-08-15 NOTE — DISCHARGE SUMMARY
Discharge Summary     Patient ID: Hiren Silverman  [id number]   68 y.o.  1950    Admit date: 8/13/2019    Discharge Date: 8/15/2019      Admitting Physician: Audie Lim MD     Discharge Physician: Audie Lim MD    Admission Diagnoses: Hydronephrosis [N13.30]; Hydronephrosis [N13.30]    Last Procedure: Procedure(s):  Loopogram, Left Antegrade Nephrostogram, Left Antegrade, Ureteroscopy,  Biopsy , Left Percutaneous Nephrostomy Tube Exchange    Discharge Diagnoses:  Active Problems:    Hydronephrosis (8/14/2019)         Consults: IR for left PCN placement    RELAVENT LABS ( within last 72 hrs):    Recent Results (from the past 72 hour(s))   HEMOGLOBIN A1C WITH EAG    Collection Time: 08/13/19  7:30 PM   Result Value Ref Range    Hemoglobin A1c 7.1 (H) 4.2 - 5.6 %    Est. average glucose 157 mg/dL   GLUCOSE, POC    Collection Time: 08/13/19 10:19 PM   Result Value Ref Range    Glucose (POC) 168 (H) 70 - 110 mg/dL   GLUCOSE, POC    Collection Time: 08/13/19 11:55 PM   Result Value Ref Range    Glucose (POC) 148 (H) 70 - 516 mg/dL   METABOLIC PANEL, BASIC    Collection Time: 08/14/19  1:30 AM   Result Value Ref Range    Sodium 143 136 - 145 mmol/L    Potassium 3.9 3.5 - 5.5 mmol/L    Chloride 109 100 - 111 mmol/L    CO2 27 21 - 32 mmol/L    Anion gap 7 3.0 - 18 mmol/L    Glucose 142 (H) 74 - 99 mg/dL    BUN 24 (H) 7.0 - 18 MG/DL    Creatinine 0.94 0.6 - 1.3 MG/DL    BUN/Creatinine ratio 26 (H) 12 - 20      GFR est AA >60 >60 ml/min/1.73m2    GFR est non-AA >60 >60 ml/min/1.73m2    Calcium 8.4 (L) 8.5 - 10.1 MG/DL   CBC W/O DIFF    Collection Time: 08/14/19  1:30 AM   Result Value Ref Range    WBC 5.2 4.6 - 13.2 K/uL    RBC 3.97 (L) 4.70 - 5.50 M/uL    HGB 9.9 (L) 13.0 - 16.0 g/dL    HCT 31.2 (L) 36.0 - 48.0 %    MCV 78.6 74.0 - 97.0 FL    MCH 24.9 24.0 - 34.0 PG    MCHC 31.7 31.0 - 37.0 g/dL    RDW 16.2 (H) 11.6 - 14.5 %    PLATELET 314 512 - 326 K/uL    MPV 10.5 9.2 - 11.8 FL   PROTHROMBIN TIME + INR Collection Time: 08/14/19  1:30 AM   Result Value Ref Range    Prothrombin time 16.0 (H) 11.5 - 15.2 sec    INR 1.3 (H) 0.8 - 1.2     GLUCOSE, POC    Collection Time: 08/14/19  8:01 AM   Result Value Ref Range    Glucose (POC) 165 (H) 70 - 110 mg/dL   GLUCOSE, POC    Collection Time: 08/14/19 12:04 PM   Result Value Ref Range    Glucose (POC) 144 (H) 70 - 110 mg/dL   GLUCOSE, POC    Collection Time: 08/14/19  3:44 PM   Result Value Ref Range    Glucose (POC) 141 (H) 70 - 110 mg/dL   GLUCOSE, POC    Collection Time: 08/14/19 10:19 PM   Result Value Ref Range    Glucose (POC) 132 (H) 70 - 800 mg/dL   METABOLIC PANEL, BASIC    Collection Time: 08/15/19  4:25 AM   Result Value Ref Range    Sodium 144 136 - 145 mmol/L    Potassium 4.4 3.5 - 5.5 mmol/L    Chloride 110 100 - 111 mmol/L    CO2 29 21 - 32 mmol/L    Anion gap 5 3.0 - 18 mmol/L    Glucose 114 (H) 74 - 99 mg/dL    BUN 20 (H) 7.0 - 18 MG/DL    Creatinine 1.12 0.6 - 1.3 MG/DL    BUN/Creatinine ratio 18 12 - 20      GFR est AA >60 >60 ml/min/1.73m2    GFR est non-AA >60 >60 ml/min/1.73m2    Calcium 8.1 (L) 8.5 - 10.1 MG/DL   GLUCOSE, POC    Collection Time: 08/15/19  9:17 AM   Result Value Ref Range    Glucose (POC) 129 (H) 70 - 110 mg/dL   GLUCOSE, POC    Collection Time: 08/15/19 12:09 PM   Result Value Ref Range    Glucose (POC) 167 (H) 70 - 110 mg/dL       Significant Diagnostic Studies: labs: as above     Hospital Course:   Normal hospital course for this procedure. Left PCN pulled out after surgery and required IR to replace on POD#1 which was uneventful. He was stable for d/c POD#1. Urine and L PCN clear. Tolerating diet. Afebrile. Pain controlled. Disposition: Home    Patient Instructions:   Current Discharge Medication List      CONTINUE these medications which have NOT CHANGED    Details   tobramycin 453.5 mg 453.5 mg by IntraVENous route every twenty-four (24) hours.   Qty: 7 Dose, Refills: 0      omeprazole (PRILOSEC) 20 mg capsule TK 1 C PO QAM PRF ACID REFLUX  Refills: 5      amoxicillin-clavulanate (AUGMENTIN) 875-125 mg per tablet Take 1 Tab by mouth every twelve (12) hours. Start 10 days prior to procedure  Qty: 28 Tab, Refills: 0      traMADol (ULTRAM) 50 mg tablet TK 1 TO 2 TS PO Q 12 H PRN P  Refills: 0      insulin degludec (TRESIBA FLEXTOUCH U-100) 100 unit/mL (3 mL) inpn 5 Units by SubCUTAneous route. acetaminophen (TYLENOL) 325 mg tablet 650 mg.      amiodarone (CORDARONE) 200 mg tablet Take 200 mg by mouth daily. atorvastatin (LIPITOR) 40 mg tablet TK 1 T PO HS FOR CHOLESTEROL  Refills: 5      CARTIA  mg ER capsule TK 2 CS PO D  Refills: 3      metoprolol tartrate (LOPRESSOR) 100 mg IR tablet Take 100 mg by mouth.      multivitamin, tx-iron-ca-min (THERAPY M) 9 mg iron-400 mcg tab tablet Take 1 Tab by mouth. XARELTO 20 mg tab tablet TK 1 T PO D  Refills: 5      temazepam (RESTORIL) 15 mg capsule 15 mg. Diet: Reference my discharge instructions. Activity: Reference my discharge instructions. No orders of the defined types were placed in this encounter. Total time discharging patient took greater than 30 minutes.     Signed:  Spike Gordon MD  August 15, 2019  2:43 PM     Pravin Reyes MD    [facility]  [facility] [place] [place]  Pager #: [phone]

## 2019-10-01 PROBLEM — I10 ACCELERATED HYPERTENSION: Status: ACTIVE | Noted: 2018-11-28

## 2019-10-01 PROBLEM — K21.9 GERD (GASTROESOPHAGEAL REFLUX DISEASE): Status: ACTIVE | Noted: 2019-10-01

## 2019-10-01 PROBLEM — R10.13 EPIGASTRIC PAIN: Status: ACTIVE | Noted: 2018-11-28

## 2019-10-01 PROBLEM — I48.20 CHRONIC ATRIAL FIBRILLATION (HCC): Status: ACTIVE | Noted: 2018-11-28

## 2019-10-01 PROBLEM — Z79.01 ANTICOAGULATED BY ANTICOAGULATION TREATMENT: Status: ACTIVE | Noted: 2018-06-29

## 2019-10-01 PROBLEM — R33.8 CLOT RETENTION OF URINE: Status: ACTIVE | Noted: 2018-09-24

## 2019-10-01 PROBLEM — M25.571 CHRONIC PAIN OF RIGHT ANKLE: Status: ACTIVE | Noted: 2018-12-31

## 2019-10-01 PROBLEM — A41.9 SEPSIS (HCC): Status: ACTIVE | Noted: 2019-04-11

## 2019-10-01 PROBLEM — C67.9 MALIGNANT NEOPLASM OF URINARY BLADDER (HCC): Status: ACTIVE | Noted: 2018-12-27

## 2019-10-01 PROBLEM — R50.9 FEVER: Status: ACTIVE | Noted: 2019-04-11

## 2019-10-01 PROBLEM — R31.29 MICROSCOPIC HEMATURIA: Status: ACTIVE | Noted: 2018-06-29

## 2019-10-01 PROBLEM — Z87.898 HISTORY OF GROSS HEMATURIA: Status: ACTIVE | Noted: 2018-06-29

## 2019-10-01 PROBLEM — N28.89 RENAL MASS, RIGHT: Status: ACTIVE | Noted: 2018-08-21

## 2019-10-01 PROBLEM — G89.29 CHRONIC PAIN OF RIGHT ANKLE: Status: ACTIVE | Noted: 2018-12-31

## 2022-07-09 PROBLEM — N13.9 OBSTRUCTIVE UROPATHY: Status: ACTIVE | Noted: 2019-08-14

## 2022-07-09 PROBLEM — T83.098A MALFUNCTION OF NEPHROSTOMY TUBE (HCC): Status: ACTIVE | Noted: 2021-03-14

## 2022-07-09 PROBLEM — T83.092A OBSTRUCTED NEPHROSTOMY TUBE (HCC): Status: ACTIVE | Noted: 2022-05-26

## 2024-06-14 NOTE — PROGRESS NOTES
0900  Received pt from IR. Awake, BP taken down, instructed pt to call for assist, came with left nephrostomy and   Right  Urostomy connected to gravity bag, bell with in reach. 21   Pt  Wife prefer to tie  Nephrostomy  On his leg, but pt is moving his leg and  Putting it higher than the  Nephrostomy bag, instructed him  Needed the bag to be on the bed but secures it with a pin. No pain. 200  Was up once to the bathroom, offered [pain med but does not want at this time. 1400  I called Dr Leonel Alvarado earlier pt will be going home today with left nephrostomy and  And right ilial conduit both secure and intact, instructions given to pt wife. 65  Medicated for pain, instructed pt and wife about securing left nephrostomy and placing a urostomy bag  In the ilial conduit. Given them  Bag and wafer, replace  Daniel bag to  Urostomy and given 2 extra  Bag and urostomy , verbalized understanding. Incisional care explained, to call Dr office if problems occur during office hour. Bleeding that does not stop/Swelling that gets worse/Pain not relieved by Medications/Fever greater than (need to indicate Fahrenheit or Celsius)/Nausea and vomiting that does not stop/Increased irritability or sluggishness

## (undated) DEVICE — SYR IRR CATH TIP LR ADPT 70ML -- CONVERT TO ITEM 363120

## (undated) DEVICE — KENDALL SCD EXPRESS SLEEVES, KNEE LENGTH, MEDIUM: Brand: KENDALL SCD

## (undated) DEVICE — BAG DRNGE NONSTERILE W/ SUCT HOSE CYSTO/UROLOGICAL FOR GE

## (undated) DEVICE — SOLUTION IV 1000ML 0.9% SOD CHL

## (undated) DEVICE — Device

## (undated) DEVICE — SOLUTION IRRIG 3000ML 0.9% SOD CHL FLX CONT 0797208] ICU MEDICAL INC]

## (undated) DEVICE — IRRIGATION KT PMP SGL ACT SAPS -- BX/5

## (undated) DEVICE — SPONGE GZ W4XL4IN COT 12 PLY TYP VII WVN C FLD DSGN

## (undated) DEVICE — SYRINGE MED 20ML STD CLR PLAS LUERLOCK TIP N CTRL DISP

## (undated) DEVICE — TRAY PREP DRY W/ PREM GLV 2 APPL 6 SPNG 2 UNDPD 1 OVERWRAP

## (undated) DEVICE — Device: Brand: MEDEX

## (undated) DEVICE — OPEN-END FLEXI-TIP URETERAL CATHETER: Brand: FLEXI-TIP

## (undated) DEVICE — STERILE POLYISOPRENE POWDER-FREE SURGICAL GLOVES: Brand: PROTEXIS

## (undated) DEVICE — SEAL INSTR CYSTO ADJ BX PRT SEAL FOR ACC UP TO 6FR